# Patient Record
Sex: MALE
[De-identification: names, ages, dates, MRNs, and addresses within clinical notes are randomized per-mention and may not be internally consistent; named-entity substitution may affect disease eponyms.]

---

## 2018-11-21 ENCOUNTER — HOSPITAL ENCOUNTER (EMERGENCY)
Dept: HOSPITAL 9 - MADERS | Age: 23
Discharge: HOME | End: 2018-11-21
Payer: COMMERCIAL

## 2018-11-21 DIAGNOSIS — L03.116: Primary | ICD-10-CM

## 2018-11-21 NOTE — RAD
LEFT FOOT THREE VIEWS:

 

HISTORY:

The patient stepped on a nail eight days ago.

 

FINDINGS:

There is mild soft tissue swelling.  No radiopaque foreign body.

 

Lisfranc alignment is maintained.  Joint spaces are preserved.  No fracture.  No cortical irregularit
y or periosteal reaction.  No radiographic evidence of osteomyelitis.

 

IMPRESSION:

1.  No radiopaque foreign body.

 

2.  No radiographic evidence of osteomyelitis.

 

POS: [facility]